# Patient Record
Sex: FEMALE | Race: WHITE | NOT HISPANIC OR LATINO
[De-identification: names, ages, dates, MRNs, and addresses within clinical notes are randomized per-mention and may not be internally consistent; named-entity substitution may affect disease eponyms.]

---

## 2020-10-09 ENCOUNTER — APPOINTMENT (OUTPATIENT)
Dept: PULMONOLOGY | Facility: HOSPITAL | Age: 60
End: 2020-10-09
Payer: MEDICARE

## 2020-10-09 DIAGNOSIS — Z86.74 PERSONAL HISTORY OF SUDDEN CARDIAC ARREST: ICD-10-CM

## 2020-10-09 DIAGNOSIS — B19.20 UNSPECIFIED VIRAL HEPATITIS C W/OUT HEPATIC COMA: ICD-10-CM

## 2020-10-09 DIAGNOSIS — F20.9 SCHIZOPHRENIA, UNSPECIFIED: ICD-10-CM

## 2020-10-09 DIAGNOSIS — G40.909 EPILEPSY, UNSPECIFIED, NOT INTRACTABLE, W/OUT STATUS EPILEPTICUS: ICD-10-CM

## 2020-10-09 DIAGNOSIS — G89.29 OTHER CHRONIC PAIN: ICD-10-CM

## 2020-10-09 DIAGNOSIS — I48.0 PAROXYSMAL ATRIAL FIBRILLATION: ICD-10-CM

## 2020-10-09 DIAGNOSIS — F32.9 MAJOR DEPRESSIVE DISORDER, SINGLE EPISODE, UNSPECIFIED: ICD-10-CM

## 2020-10-09 DIAGNOSIS — K74.60 UNSPECIFIED CIRRHOSIS OF LIVER: ICD-10-CM

## 2020-10-09 PROBLEM — Z00.00 ENCOUNTER FOR PREVENTIVE HEALTH EXAMINATION: Status: ACTIVE | Noted: 2020-10-09

## 2020-10-09 PROCEDURE — 99214 OFFICE O/P EST MOD 30 MIN: CPT

## 2020-10-09 NOTE — PHYSICAL EXAM
[General Appearance - Well Developed] : well developed [Normal Appearance] : normal appearance [Apical Impulse] : the apical impulse was normal [Heart Sounds] : normal S1 and S2 [] : no respiratory distress [Nail Clubbing] : no clubbing of the fingernails [No Focal Deficits] : no focal deficits

## 2020-10-09 NOTE — ASSESSMENT
[FreeTextEntry1] : 60 y old woman with central apnea has difficulty with Bipap, today we went over the machine and mask fitting. Patient is comfortable using it and able to fit mask by herself.\par Patient to return in 4 weeks for follow up and looking at the downlaod data.

## 2020-10-09 NOTE — HISTORY OF PRESENT ILLNESS
[FreeTextEntry1] : 60-year-old woman with chronic alcoholism\par complicated by ICU admissions in the past, seizure disorder, as well as cardiac\par arrest, history of paroxysmal AFib on aspirin, chronic pain on chronic opioids,\par anxiety/depression, asthma/COPD who is a chronic smoker, bilateral hip\par replacements, history of hepatitis C, possible cirrhosis or liver disease, schizoprenia, drug abuse, central sleep apnea needs BIPAP\par Patient is not able to figure out how to use the bipap. Today we went over the machine again in detail, she is able to use it in my office. She is able to fit the mask by herself.\par She will use it everynight and see me in few weeks.\par \par Patient was admitted to ICU as the hallucinations were not improving on the medical floors, While in ICU combination of valium low dose and oxycodone controlled her hallucinations and movements.\par \par Currently patient is arousable able to give history and answer appropriately. Can be transferred back to the medical floors.

## 2020-11-09 ENCOUNTER — APPOINTMENT (OUTPATIENT)
Dept: PULMONOLOGY | Facility: HOSPITAL | Age: 60
End: 2020-11-09
Payer: MEDICARE

## 2020-11-09 PROCEDURE — 99213 OFFICE O/P EST LOW 20 MIN: CPT

## 2020-11-09 RX ORDER — QUETIAPINE FUMARATE 200 MG/1
200 TABLET ORAL
Qty: 30 | Refills: 0 | Status: ACTIVE | COMMUNITY
Start: 2020-10-20

## 2020-11-09 RX ORDER — LEVETIRACETAM 1000 MG/1
1000 TABLET, FILM COATED ORAL
Qty: 180 | Refills: 0 | Status: ACTIVE | COMMUNITY
Start: 2020-10-15

## 2020-11-09 RX ORDER — THIAMINE MONONITRATE (VIT B1) 100 MG
100 TABLET ORAL
Qty: 90 | Refills: 0 | Status: ACTIVE | COMMUNITY
Start: 2020-02-18

## 2020-11-09 RX ORDER — PAROXETINE HYDROCHLORIDE 40 MG/1
40 TABLET, FILM COATED ORAL
Qty: 30 | Refills: 0 | Status: ACTIVE | COMMUNITY
Start: 2020-11-07

## 2020-11-09 RX ORDER — PANTOPRAZOLE 40 MG/1
40 TABLET, DELAYED RELEASE ORAL
Qty: 30 | Refills: 0 | Status: ACTIVE | COMMUNITY
Start: 2020-02-18

## 2020-11-09 RX ORDER — TRIAMCINOLONE ACETONIDE 0.25 MG/G
0.03 CREAM TOPICAL
Qty: 15 | Refills: 0 | Status: ACTIVE | COMMUNITY
Start: 2020-09-30

## 2020-11-09 RX ORDER — PAROXETINE HYDROCHLORIDE 20 MG/1
20 TABLET, FILM COATED ORAL
Qty: 90 | Refills: 0 | Status: ACTIVE | COMMUNITY
Start: 2020-07-09

## 2020-11-09 RX ORDER — BUPRENORPHINE AND NALOXONE 8; 2 MG/1; MG/1
8-2 TABLET SUBLINGUAL
Qty: 90 | Refills: 0 | Status: ACTIVE | COMMUNITY
Start: 2020-10-22

## 2020-11-09 RX ORDER — DICLOFENAC SODIUM 1% 10 MG/G
1 GEL TOPICAL
Qty: 100 | Refills: 0 | Status: ACTIVE | COMMUNITY
Start: 2020-06-12

## 2020-11-09 RX ORDER — FLUTICASONE PROPIONATE AND SALMETEROL 50; 250 UG/1; UG/1
250-50 POWDER RESPIRATORY (INHALATION)
Qty: 60 | Refills: 0 | Status: ACTIVE | COMMUNITY
Start: 2020-02-27

## 2020-11-09 RX ORDER — METOPROLOL SUCCINATE 50 MG/1
50 TABLET, EXTENDED RELEASE ORAL
Qty: 180 | Refills: 0 | Status: ACTIVE | COMMUNITY
Start: 2020-10-08

## 2020-11-09 RX ORDER — LEVOTHYROXINE SODIUM 0.09 MG/1
88 TABLET ORAL
Qty: 30 | Refills: 0 | Status: ACTIVE | COMMUNITY
Start: 2020-11-02

## 2020-11-09 RX ORDER — CHLORHEXIDINE GLUCONATE 4 %
325 (65 FE) LIQUID (ML) TOPICAL
Qty: 90 | Refills: 0 | Status: ACTIVE | COMMUNITY
Start: 2020-05-18

## 2020-11-09 RX ORDER — ALBUTEROL SULFATE 90 UG/1
108 (90 BASE) INHALANT RESPIRATORY (INHALATION)
Qty: 8 | Refills: 0 | Status: ACTIVE | COMMUNITY
Start: 2020-09-13

## 2020-11-09 NOTE — ASSESSMENT
[FreeTextEntry1] : 60 y old woman with central apnea has difficulty with Bipap, today we went over the machine and mask fitting. Patient is comfortable using it and able to fit mask by herself.\par Patient to return in 8 weeks for follow up and looking at the downlaod data.

## 2021-01-11 ENCOUNTER — APPOINTMENT (OUTPATIENT)
Dept: PULMONOLOGY | Facility: HOSPITAL | Age: 61
End: 2021-01-11
Payer: MEDICARE

## 2021-01-11 VITALS — HEIGHT: 67 IN | BODY MASS INDEX: 21.97 KG/M2 | WEIGHT: 140 LBS

## 2021-01-11 DIAGNOSIS — F10.10 ALCOHOL ABUSE, UNCOMPLICATED: ICD-10-CM

## 2021-01-11 DIAGNOSIS — F17.200 NICOTINE DEPENDENCE, UNSPECIFIED, UNCOMPLICATED: ICD-10-CM

## 2021-01-11 PROCEDURE — 99214 OFFICE O/P EST MOD 30 MIN: CPT

## 2021-01-11 RX ORDER — GABAPENTIN 100 MG/1
100 CAPSULE ORAL
Qty: 180 | Refills: 0 | Status: DISCONTINUED | COMMUNITY
Start: 2020-08-18 | End: 2021-01-11

## 2021-01-11 NOTE — PHYSICAL EXAM
[General Appearance - Well Developed] : well developed [Normal Appearance] : normal appearance [Apical Impulse] : the apical impulse was normal [Heart Sounds] : normal S1 and S2 [] : no respiratory distress [Nail Clubbing] : no clubbing of the fingernails [No Focal Deficits] : no focal deficits [FreeTextEntry1] : smal amount of crackles both lungs

## 2021-01-11 NOTE — ASSESSMENT
[FreeTextEntry1] : 60 y old woman with central apnea doing well with bipap.\par \par She opens her mouth with nasal mask, will order chin strap (pt couldn’t use fullface mask).\par \par Pt is compliant and benefitted with bipap.

## 2021-01-11 NOTE — HISTORY OF PRESENT ILLNESS
[FreeTextEntry1] : 60-year-old woman with chronic alcoholism\par complicated by ICU admissions in the past now stopped since oct 2020, seizure disorder, as well as cardiac\par arrest, history of paroxysmal AFib on aspirin, chronic pain on saboxone,\par anxiety/depression, asthma/COPD who is a chronic smoker, bilateral hip\par replacements, history of hepatitis C, possible cirrhosis or liver disease, schizoprenia, drug abuse, central sleep apnea needs BIPAP\par Patient is  on nasal mask and is doing well. But leak is a problem, asked her to do chin strap with nasal mask.\par \par \par Patient was admitted to ICU as the hallucinations were not improving on the medical floors, While in ICU combination of valium low dose and oxycodone controlled her hallucinations and movements.\par \par ahi 8.9\par pressure 12/8\par usage 6 hrs

## 2021-02-08 ENCOUNTER — APPOINTMENT (OUTPATIENT)
Dept: PULMONOLOGY | Facility: HOSPITAL | Age: 61
End: 2021-02-08
Payer: MEDICARE

## 2021-02-08 VITALS — BODY MASS INDEX: 21.5 KG/M2 | WEIGHT: 137 LBS | HEIGHT: 67 IN

## 2021-02-08 PROCEDURE — 99213 OFFICE O/P EST LOW 20 MIN: CPT

## 2021-02-08 NOTE — HISTORY OF PRESENT ILLNESS
[FreeTextEntry1] : 60-year-old woman with chronic alcoholism\par complicated by ICU admissions in the past now stopped since oct 2020, seizure disorder, as well as cardiac\par arrest, history of paroxysmal AFib on aspirin, chronic pain on saboxone (has stopped pain killers in oct),\par anxiety/depression, asthma/COPD who is a chronic smoker, bilateral hip\par replacements, history of hepatitis C, possible cirrhosis or liver disease, schizoprenia, drug abuse, central sleep apnea needs BIPAP\par Patient is  on nasal mask and is doing well. But leak is a problem, asked her to do chin strap with nasal mask.\par \par ahi 8.9\par pressure 14/10\par usage 6 hrs

## 2021-02-08 NOTE — ASSESSMENT
[FreeTextEntry1] : 60 y old woman with central apnea doing well with bipap.\par \par Pt is compliant and benefitted with bipap. Will continue bipap and reevaluate with a sleep study in future.\par Since she stopped pain killers and alcohol, we can get a new baseline of her sleep study.

## 2021-03-18 ENCOUNTER — APPOINTMENT (OUTPATIENT)
Dept: PULMONOLOGY | Facility: HOSPITAL | Age: 61
End: 2021-03-18

## 2021-03-26 ENCOUNTER — APPOINTMENT (OUTPATIENT)
Dept: PULMONOLOGY | Facility: HOSPITAL | Age: 61
End: 2021-03-26
Payer: MEDICARE

## 2021-03-26 VITALS — BODY MASS INDEX: 21.5 KG/M2 | WEIGHT: 137 LBS | HEIGHT: 67 IN

## 2021-03-26 PROCEDURE — 99214 OFFICE O/P EST MOD 30 MIN: CPT | Mod: 95

## 2021-03-26 NOTE — HISTORY OF PRESENT ILLNESS
[FreeTextEntry1] : 60-year-old woman with chronic alcoholism\par complicated by ICU admissions in the past now stopped since oct 2020, seizure disorder, as well as cardiac\par arrest, history of paroxysmal AFib on aspirin, chronic pain on saboxone (has stopped pain killers in oct 2020),\par anxiety/depression, asthma/COPD who is a chronic smoker, bilateral hip\par replacements, history of hepatitis C, possible cirrhosis or liver disease, schizoprenia, drug abuse history, central sleep apnea needs BIPAP\par Patient is  on nasal mask and is doing well. But leak is a problem, AHI is elevated mostly on days of leak.\par \par ahi 13 (central as per download)\par pressure  12/8\par usage 6 hrs\par \par given that pt stopped pain killers and alcohol will get a new baseline for sleep study. But pt is getting her shoulder surgery this week. So once she recovers will do a study and re assess.

## 2021-03-26 NOTE — REASON FOR VISIT
[Follow-Up] : a follow-up visit [Home] : at home, [unfilled] , at the time of the visit. [Medical Office: ([facility])___] : at the medical office located in  [Verbal consent obtained from patient] : the patient, [unfilled] [FreeTextEntry1] : sleep apnea

## 2021-03-26 NOTE — ASSESSMENT
[FreeTextEntry1] : 60 y old woman with central apnea doing well with bipap.\par \par Pt is compliant and benefitted with bipap. Will continue bipap and reevaluate with a sleep study in future.\par Since she stopped pain killers and alcohol, we can get a new baseline of her sleep study to see if the central apneas disappeared.\par she is getting shoulder surgery and may go on pain killers for short duration. We can reassess the situation once she recovers from the surgery.

## 2021-07-22 ENCOUNTER — NON-APPOINTMENT (OUTPATIENT)
Age: 61
End: 2021-07-22

## 2022-03-03 ENCOUNTER — APPOINTMENT (OUTPATIENT)
Dept: PULMONOLOGY | Facility: CLINIC | Age: 62
End: 2022-03-03
Payer: MEDICARE

## 2022-03-03 VITALS
HEART RATE: 65 BPM | WEIGHT: 137 LBS | BODY MASS INDEX: 21.5 KG/M2 | DIASTOLIC BLOOD PRESSURE: 70 MMHG | HEIGHT: 67 IN | SYSTOLIC BLOOD PRESSURE: 116 MMHG

## 2022-03-03 DIAGNOSIS — G47.31 PRIMARY CENTRAL SLEEP APNEA: ICD-10-CM

## 2022-03-03 PROCEDURE — 99214 OFFICE O/P EST MOD 30 MIN: CPT

## 2022-03-03 RX ORDER — FAMOTIDINE 20 MG/1
20 TABLET, FILM COATED ORAL
Qty: 180 | Refills: 0 | Status: DISCONTINUED | COMMUNITY
Start: 2020-08-13 | End: 2022-03-03

## 2022-03-03 NOTE — PHYSICAL EXAM
[General Appearance - Well Developed] : well developed [General Appearance - Well Nourished] : well nourished [III] : III [Heart Sounds] : normal S1 and S2 [Murmurs] : no murmurs [] : no respiratory distress [Auscultation Breath Sounds / Voice Sounds] : lungs were clear to auscultation bilaterally [No Focal Deficits] : no focal deficits [Oriented To Time, Place, And Person] : oriented to person, place, and time [Memory Recent] : recent memory was not impaired [Enlarged Base of the Tongue] : enlargement of the base of the tongue

## 2022-03-03 NOTE — ASSESSMENT
[FreeTextEntry1] : 61 y old woman with central apnea didn’t tolerate bipap.\par \par Since she stopped pain killers and alcohol, we can get a new baseline of her sleep study to see if the central apneas disappeared and if there is any obstructive apnea.\par \par Patient is asking about inspire therapy.